# Patient Record
Sex: FEMALE | Race: BLACK OR AFRICAN AMERICAN | NOT HISPANIC OR LATINO | URBAN - METROPOLITAN AREA
[De-identification: names, ages, dates, MRNs, and addresses within clinical notes are randomized per-mention and may not be internally consistent; named-entity substitution may affect disease eponyms.]

---

## 2021-06-09 ENCOUNTER — HISTORICAL (OUTPATIENT)
Dept: ADMINISTRATIVE | Facility: HOSPITAL | Age: 36
End: 2021-06-09

## 2021-06-09 LAB
ABS NEUT (OLG): 2.4 X10(3)/MCL (ref 2.1–9.2)
ALBUMIN SERPL-MCNC: 4.5 GM/DL (ref 3.4–5)
ALBUMIN/GLOB SERPL: 1.55 {RATIO} (ref 1.5–2.5)
ALP SERPL-CCNC: 76 UNIT/L (ref 38–126)
ALT SERPL-CCNC: 17 UNIT/L (ref 7–52)
AST SERPL-CCNC: 20 UNIT/L (ref 15–37)
BILIRUB SERPL-MCNC: 0.3 MG/DL (ref 0.2–1)
BILIRUBIN DIRECT+TOT PNL SERPL-MCNC: 0.1 MG/DL (ref 0–0.5)
BILIRUBIN DIRECT+TOT PNL SERPL-MCNC: 0.2 MG/DL
BUN SERPL-MCNC: 11 MG/DL (ref 7–18)
CALCIUM SERPL-MCNC: 9.7 MG/DL (ref 8.5–10)
CHLORIDE SERPL-SCNC: 104 MMOL/L (ref 98–107)
CHOLEST SERPL-MCNC: 138 MG/DL (ref 0–200)
CHOLEST/HDLC SERPL: 2.3 {RATIO}
CO2 SERPL-SCNC: 24 MMOL/L (ref 21–32)
CREAT SERPL-MCNC: 0.86 MG/DL (ref 0.6–1.3)
ERYTHROCYTE [DISTWIDTH] IN BLOOD BY AUTOMATED COUNT: 14.1 % (ref 11.5–17)
GLOBULIN SER-MCNC: 2.9 GM/DL (ref 1.2–3)
GLUCOSE SERPL-MCNC: 85 MG/DL (ref 74–106)
HCT VFR BLD AUTO: 38.3 % (ref 37–47)
HDLC SERPL-MCNC: 60 MG/DL (ref 35–60)
HGB BLD-MCNC: 12.4 GM/DL (ref 12–16)
LDLC SERPL CALC-MCNC: 64 MG/DL (ref 0–129)
LYMPHOCYTES # BLD AUTO: 2 X10(3)/MCL (ref 0.6–3.4)
LYMPHOCYTES NFR BLD AUTO: 41.3 % (ref 13–40)
MCH RBC QN AUTO: 28.2 PG (ref 27–31.2)
MCHC RBC AUTO-ENTMCNC: 32 GM/DL (ref 32–36)
MCV RBC AUTO: 87 FL (ref 80–94)
MONOCYTES # BLD AUTO: 0.4 X10(3)/MCL (ref 0.1–1.3)
MONOCYTES NFR BLD AUTO: 9.2 % (ref 0.1–24)
NEUTROPHILS NFR BLD AUTO: 49.5 % (ref 47–80)
PLATELET # BLD AUTO: 371 X10(3)/MCL (ref 130–400)
PMV BLD AUTO: 8.9 FL (ref 9.4–12.4)
POTASSIUM SERPL-SCNC: 4.5 MMOL/L (ref 3.5–5.1)
PROT SERPL-MCNC: 7.4 GM/DL (ref 6.4–8.2)
RBC # BLD AUTO: 4.4 X10(6)/MCL (ref 4.2–5.4)
SODIUM SERPL-SCNC: 138 MMOL/L (ref 136–145)
TRIGL SERPL-MCNC: 62 MG/DL (ref 30–150)
TSH SERPL-ACNC: 1.47 MIU/ML (ref 0.35–4.94)
VLDLC SERPL CALC-MCNC: 12.4 MG/DL
WBC # SPEC AUTO: 4.8 X10(3)/MCL (ref 4.5–11.5)

## 2022-04-10 ENCOUNTER — HISTORICAL (OUTPATIENT)
Dept: ADMINISTRATIVE | Facility: HOSPITAL | Age: 37
End: 2022-04-10

## 2022-04-25 VITALS
HEIGHT: 66 IN | SYSTOLIC BLOOD PRESSURE: 135 MMHG | BODY MASS INDEX: 32.42 KG/M2 | WEIGHT: 201.75 LBS | DIASTOLIC BLOOD PRESSURE: 96 MMHG | OXYGEN SATURATION: 97 %

## 2022-05-03 NOTE — HISTORICAL [facility] CERNER
This is a historical note converted from Shira. Formatting and pictures may have been removed.  Please reference Shira for original formatting and attached multimedia. Chief Complaint  CPX FASTING  History of Present Illness  36?year old female presents for wellness visit.  Blood Pressure - 128/88  BMI - 33.92  Immunizations -?Declines Tetanus vaccine  Breast Cancer Screening - up to date, Sees Dr. Chiara Robert  Cervical Cancer Screening -?up to date, Sees Dr. Chiara Robert  Diet - Intermittent fasting  Exercise - 1-2 times weekly cardio 45 minutes  Patient does report?occasional?dizziness intermittently. ?Denies associated with activity.? She does report increased anxiety?mostly with?situational stressors.? She has recently?went through a divorce.  Review of Systems  Constitutional:?No weight loss, no fever, no fatigue, no chills, no night sweats,?no weakness  Eyes:?No blurred vision,?no redness,?no drainage,?no ocular?pain  HEENT:?No sore throat,?no ear pain, no sinus pressure, no nasal congestion, no rhinorrhea, no postnasal drip  Respiratory:?No cough, no wheezing, no sputum production, no shortness of breath  Cardiovascular:?No chest pain, no palpitations, no dyspnea on exertion,?no orthopnea  Gastrointestinal:?No nausea, no vomiting, no abdominal pain, no diarrhea,?no constipation, no melena,?no hematochezia  Genitourinary:?No dysuria, no hematuria, no frequency, no urgency, no incontinence, no discharge  Musculoskeletal:?No myalgias, no arthralgias, no weakness, no joint effusion, no edema  Integumentary:?No rashes, no hives, no itching, no lesions, no jaundice  Neurologic:?No headaches, no numbness, no tingling, no weakness, no dizziness  Psychiatric:?No anxiety, no irritability, no depression,?no suicidal ideations, no?homicidal ideations,?no delusions, no hallucinations  Endocrine:?No polyuria, no polydipsia, no polyphagia  Hematology:?No bruising, no lymphadenopathy,?no paleness  ?  Physical  Exam  Vitals & Measurements  HR:?99(Peripheral)? BP:?128/88?  HT:?167.00?cm? WT:?94.600?kg? BMI:?33.92?  General:?Well developed, Well-nourished, in No acute distress, A&O x 4  Eye:?PERRLA, EOMI, Clear conjunctiva, Eyelids unremarkable  Ears:?Bilateral EAC clear?and?Bilateral TM clear  Nose:? Mucosa normal, No rhinorrhea, No lesions  O/P:??No?erythema,?No tonsillar hypertrophy,?No tonsillar exudates,?No cobblestoning  Neck:?Soft, Nontender, No thyromegaly, Full range of motion, No cervical lymphadenopathy, No JVD  Cardiovascular:?Regular rate and rhythm, No murmurs, No gallops, No rubs  Respiratory:?Clear to auscultation bilaterally, No wheezes, No rhonchi, No?crackles  Abdomen:? Soft, Nontender, No hepatosplenomegaly, Normal and equal bowel sounds, No masses, No rebound, No guarding  Musculoskeletal:? No tenderness, FROM, No joint abnormality, No clubbing, No cyanosis,No?edema  Neurologic:? No motor or sensory deficits, Reflexes 2+ throughout, CN II-XII grossly intact  Integumentary:?No rashes or skin lesions  ?  Assessment/Plan  1.?Wellness examination?Z00.00  ?Discussed the?importance of maintaining a balanced diet and regular exercise  CBC, CMP, FLP, TSH today  Ordered:  Comprehensive Metabolic Panel, Routine collect, 06/09/21 15:52:00 CDT, Blood, Stop date 06/09/21 15:52:00 CDT, Lab Collect, Wellness examination, 06/09/21 15:52:00 CDT  Lipid Panel, Routine collect, 06/09/21 15:52:00 CDT, Blood, Stop date 06/09/21 15:52:00 CDT, Lab Collect, Wellness examination, 06/09/21 15:52:00 CDT  Preventative Health Care Est 18-39 years [id number] PC, Wellness examination, [facility] - [facility], 06/09/21 15:38:00 CDT  Thyroid Stimulating Hormone, Routine collect, 06/09/21 15:52:00 CDT, Blood, Stop date 06/09/21 15:52:00 CDT, Lab Collect, Wellness examination, 06/09/21 15:52:00 CDT  ?  2.?Dizziness?R42  ?EKG-normal sinus rhythm without any significant ST or T wave changes  Suspect could be?manifestation of underlying Anxiety.  ?Discussed?management techniques and coping skills.? If symptoms persist or worsen?patient to follow-up and will consider further evaluation and treatment.  ?   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  Mother: History is negative  Sister: History is negative  Immunizations  Vaccine Date Status   hepatitis B pediatric vaccine 04/16/2002 Recorded   measles/mumps/rubella virus vaccine 03/12/2002 Recorded   hepatitis B pediatric vaccine 03/12/2002 Recorded   poliovirus vaccine, live, trivalent 03/09/1989 Recorded   poliovirus vaccine, live, trivalent 07/09/1985 Recorded   measles/mumps/rubella virus vaccine 07/09/1985 Recorded   poliovirus vaccine, live, trivalent 1984 Recorded   poliovirus vaccine, live, trivalent 1984 Recorded   Health Maintenance  Health Maintenance  ???Pending?(in the next year)  ??? ??OverDue  ??? ? ? ?Cervical Cancer Screening due??08/17/14??and every 3??year(s)  ??? ? ? ?Influenza Vaccine due??10/01/20??and every 1??day(s)  ??? ? ? ?Smoking Cessation due??01/01/21??Variable frequency  ??? ? ? ?Alcohol Misuse Screening due??01/02/21??and every 1??year(s)  ??? ??Due?  ??? ? ? ?ADL Screening due??06/09/21??and every 1??year(s)  ??? ? ? ?Depression Screening due??06/09/21??Unknown Frequency  ??? ? ? ?Diabetes Screening due??06/09/21??Unknown Frequency  ??? ? ? ?Tetanus Vaccine  due??06/09/21??and every 10??year(s)  ??? ??Due In Future?  ??? ? ? ?Obesity Screening not due until??01/01/22??and every 1??year(s)  ???Satisfied?(in the past 1 year)  ??? ??Satisfied?  ??? ? ? ?Blood Pressure Screening on??06/09/21.??Satisfied by Izzy Roberts LPN  ??? ? ? ?Body Mass Index Check on??06/09/21.??Satisfied by Izzy Roberts LPN  ??? ? ? ?Obesity Screening on??06/09/21.??Satisfied by Izzy Roberts LPN  ?

## 2022-07-13 PROBLEM — E66.9 OBESITY: Status: ACTIVE | Noted: 2022-07-13

## 2022-07-13 PROBLEM — I10 HYPERTENSION: Status: RESOLVED | Noted: 2022-07-13 | Resolved: 2022-07-13

## 2022-07-13 PROBLEM — I10 HYPERTENSION: Status: ACTIVE | Noted: 2022-07-13

## 2022-10-19 ENCOUNTER — HOSPITAL ENCOUNTER (EMERGENCY)
Facility: HOSPITAL | Age: 37
Discharge: HOME | End: 2022-10-19
Attending: EMERGENCY MEDICINE
Payer: COMMERCIAL

## 2022-10-19 VITALS
TEMPERATURE: 99 F | HEART RATE: 69 BPM | WEIGHT: 200 LBS | OXYGEN SATURATION: 100 % | SYSTOLIC BLOOD PRESSURE: 139 MMHG | DIASTOLIC BLOOD PRESSURE: 92 MMHG | BODY MASS INDEX: 32.14 KG/M2 | HEIGHT: 66 IN | RESPIRATION RATE: 16 BRPM

## 2022-10-19 DIAGNOSIS — L30.9 HAND ECZEMA: Primary | ICD-10-CM

## 2022-10-19 PROCEDURE — 99281 EMR DPT VST MAYX REQ PHY/QHP: CPT

## 2022-10-19 NOTE — Clinical Note
"Tiffany Daley" Fer was seen and treated in our emergency department on 10/19/2022.  She may return to work on 10/20/2022.       If you have any questions or concerns, please don't hesitate to call.      Opal Jimenez MD"

## 2022-10-19 NOTE — ED PROVIDER NOTES
Encounter Date: 10/19/2022       History     Chief Complaint   Patient presents with    Rash     Hx of eczema but saw her dermatologist but he did a culture and told her she had staph. Taken antibiotics since Sunday but she does not think it is getting better. Rash to hands, arms and legs.      37-year-old female with a history of eczema and latex allergy states that she has been seeing a dermatologist who has been treating her hands with clobetasol cream.  The rash on her hands began to weak so he did a culture in last week started her on an antibiotic for staph infection.  Had told her to stop the clobetasol but when she spoke to the dermatologist again today, she was told to restart it.  However, she presents to the emergency room confused as to what to do because she states the steroid cream seem to have made her worse.  However, she also states that she is still using latex gloves at her job on occasion and she know she has a latex allergy.      Review of patient's allergies indicates:  No Known Allergies  History reviewed. No pertinent past medical history.  Past Surgical History:   Procedure Laterality Date    Keloid Excision Bilateral Ears (2008)      Tonsillectomy and adenoidectomy; age 11 or over (1991)       Family History   Problem Relation Age of Onset    No Known Problems Mother     Hypertension Father     No Known Problems Sister      Social History     Tobacco Use    Smoking status: Some Days     Packs/day: 0.25     Types: Cigarettes     Start date: 2012    Smokeless tobacco: Never   Substance Use Topics    Alcohol use: Yes     Comment: occasionally    Drug use: Never     Review of Systems   Skin:  Positive for rash.   All other systems reviewed and are negative.    Physical Exam     Initial Vitals [10/19/22 1738]   BP Pulse Resp Temp SpO2   (!) 139/92 69 16 99 °F (37.2 °C) 100 %      MAP       --         Physical Exam    Nursing note and vitals reviewed.  Constitutional: She appears well-developed  and well-nourished. She is not diaphoretic. No distress.   HENT:   Head: Normocephalic and atraumatic.   Mouth/Throat: Oropharynx is clear and moist.   Eyes: Conjunctivae are normal. Pupils are equal, round, and reactive to light.   Neck: Neck supple.   Cardiovascular:  Normal rate, regular rhythm, normal heart sounds and intact distal pulses.           Pulmonary/Chest: Breath sounds normal. No respiratory distress. She has no wheezes. She has no rhonchi. She has no rales.   Abdominal: Abdomen is soft. Bowel sounds are normal. She exhibits no distension. There is no abdominal tenderness. There is no guarding.   Musculoskeletal:         General: No tenderness or edema. Normal range of motion.      Cervical back: Neck supple.     Neurological: She is alert and oriented to person, place, and time.   Skin: Skin is warm and dry. Capillary refill takes less than 2 seconds. No rash noted.   Hyperpigmented thickened skin noted to the hands with some fissures, no drainage, no purulence discharge.  Erythematous slightly relays to lesions to the forearms and anterior shin.   Psychiatric: She has a normal mood and affect. Thought content normal.       ED Course   Procedures  Labs Reviewed - No data to display       Imaging Results    None          Medications - No data to display  Medical Decision Making:   Initial Assessment:   37-year-old female with a history of hand eczema cell dermatologist and had a culture that showed a staph infection.  She started on antibiotics last week prescribed by the dermatologist and was told to resume her clobetasol which she does not want to do.  She is coming to the emergency room for a 2nd opinion.  Of note, she has a latex allergy and is continuing to wear latex gloves.  Differential Diagnosis:   Differential diagnosis includes but is not limited to bacterial skin infection, eczema, contact dermatitis  ED Management:  I strongly recommended that the patient follow-up with her dermatologist,  follow her dermatologist recommendations for treatment, and discontinuing using latex gloves.  There is no further testing to be done in the emergency room.  She is stable for discharge and all questions were answered.                        Clinical Impression:   Final diagnoses:  [L30.9] Hand eczema (Primary)      ED Disposition Condition    Discharge Stable          ED Prescriptions    None       Follow-up Information       Follow up With Specialties Details Why Contact Info    Dermatology  Schedule an appointment as soon as possible for a visit                Opal Jimenez MD  10/20/22 [id number]

## 2022-10-19 NOTE — DISCHARGE INSTRUCTIONS
Follow your dermatologist instructions.  Call your dermatologist to schedule a follow-up appointment.  Her dermatologist is the best person to have advise you as to how to treat your rash.  Avoid latex gloves.

## 2022-10-19 NOTE — Clinical Note
"Tiffany Daley" Fer was seen and treated in our emergency department on 10/19/2022.  She may return to work on 10/21/2022.       If you have any questions or concerns, please don't hesitate to call.      Opal Jimenez MD"